# Patient Record
Sex: FEMALE | Race: WHITE | NOT HISPANIC OR LATINO | Employment: UNEMPLOYED | URBAN - METROPOLITAN AREA
[De-identification: names, ages, dates, MRNs, and addresses within clinical notes are randomized per-mention and may not be internally consistent; named-entity substitution may affect disease eponyms.]

---

## 2021-06-10 ENCOUNTER — OFFICE VISIT (OUTPATIENT)
Dept: OBSTETRICS AND GYNECOLOGY | Facility: CLINIC | Age: 46
End: 2021-06-10

## 2021-06-10 ENCOUNTER — TELEPHONE (OUTPATIENT)
Dept: OBSTETRICS AND GYNECOLOGY | Facility: CLINIC | Age: 46
End: 2021-06-10

## 2021-06-10 VITALS
DIASTOLIC BLOOD PRESSURE: 82 MMHG | WEIGHT: 163 LBS | SYSTOLIC BLOOD PRESSURE: 140 MMHG | BODY MASS INDEX: 30.78 KG/M2 | HEIGHT: 61 IN

## 2021-06-10 DIAGNOSIS — E04.9 ENLARGED THYROID: ICD-10-CM

## 2021-06-10 DIAGNOSIS — Z01.419 ROUTINE GYNECOLOGICAL EXAMINATION: Primary | ICD-10-CM

## 2021-06-10 DIAGNOSIS — Z11.51 SPECIAL SCREENING EXAMINATION FOR HUMAN PAPILLOMAVIRUS (HPV): ICD-10-CM

## 2021-06-10 DIAGNOSIS — Z01.419 PAP SMEAR, LOW-RISK: ICD-10-CM

## 2021-06-10 LAB
BILIRUB BLD-MCNC: NEGATIVE MG/DL
CLARITY, POC: CLEAR
COLOR UR: YELLOW
GLUCOSE UR STRIP-MCNC: NEGATIVE MG/DL
KETONES UR QL: NEGATIVE
LEUKOCYTE EST, POC: NEGATIVE
NITRITE UR-MCNC: NEGATIVE MG/ML
PH UR: 5 [PH] (ref 5–8)
PROT UR STRIP-MCNC: NEGATIVE MG/DL
RBC # UR STRIP: NEGATIVE /UL
SP GR UR: 1.03 (ref 1–1.03)
UROBILINOGEN UR QL: NORMAL

## 2021-06-10 PROCEDURE — 99386 PREV VISIT NEW AGE 40-64: CPT | Performed by: OBSTETRICS & GYNECOLOGY

## 2021-06-10 PROCEDURE — 99214 OFFICE O/P EST MOD 30 MIN: CPT | Performed by: OBSTETRICS & GYNECOLOGY

## 2021-06-10 PROCEDURE — 81002 URINALYSIS NONAUTO W/O SCOPE: CPT | Performed by: OBSTETRICS & GYNECOLOGY

## 2021-06-10 RX ORDER — METAXALONE 800 MG/1
800 TABLET ORAL DAILY PRN
COMMUNITY
Start: 2021-06-09

## 2021-06-10 RX ORDER — ACETAMINOPHEN 325 MG/1
650 TABLET ORAL EVERY 6 HOURS PRN
COMMUNITY

## 2021-06-10 NOTE — PROGRESS NOTES
GYN Annual Exam     CC- Here for annual exam.     Chante Thompson is a 45 y.o. female who presents for annual well woman exam. Pt has not been seen in over 4 years. She is considered a new pt to me. Pt has hx of BTL. Pt is getting her cycle every month and it is lasting 6-10 days. Pt reports that her primary care physician sent her to see ENT due to an enlarged thyroid. She was seen by ENT and had a biopsy of her thyroid and told it was fine. Pt reports that it is now enlarging again.     OB History    No obstetric history on file.         Current contraception: tubal ligation  History of abnormal Pap smear: yes - s/p LEEP  History of abnormal mammogram: no  Family history of uterine, colon or ovarian cancer: no  Family history of breast cancer: yes - mother with breast cancer greater than age 50.    Health Maintenance   Topic Date Due   • Annual Gynecologic Pelvic and Breast Exam  Never done   • COLORECTAL CANCER SCREENING  Never done   • ANNUAL PHYSICAL  Never done   • COVID-19 Vaccine (1) Never done   • TDAP/TD VACCINES (1 - Tdap) Never done   • HEPATITIS C SCREENING  Never done   • PAP SMEAR  Never done   • INFLUENZA VACCINE  08/01/2021   • Pneumococcal Vaccine 0-64  Aged Out       History reviewed. No pertinent past medical history.    Past Surgical History:   Procedure Laterality Date   • BACK SURGERY     • EYE SURGERY     • TUBAL ABDOMINAL LIGATION           Current Outpatient Medications:   •  acetaminophen (TYLENOL) 325 MG tablet, Take 650 mg by mouth Every 6 (Six) Hours As Needed for Mild Pain ., Disp: , Rfl:   •  levocetirizine (XYZAL) 5 MG tablet, Take 5 mg by mouth Every Evening., Disp: , Rfl:   •  metaxalone (SKELAXIN) 800 MG tablet, , Disp: , Rfl:     No Known Allergies    Social History     Tobacco Use   • Smoking status: Never Smoker   • Smokeless tobacco: Never Used   Vaping Use   • Vaping Use: Never used   Substance Use Topics   • Alcohol use: Not Currently   • Drug use: Defer       History  "reviewed. No pertinent family history.    Review of Systems    /82   Ht 154.9 cm (61\")   Wt 73.9 kg (163 lb)   LMP 05/17/2021   BMI 30.80 kg/m²     Physical Exam  Vitals reviewed.   Constitutional:       General: She is not in acute distress.     Appearance: Normal appearance. She is well-developed and normal weight. She is not ill-appearing, toxic-appearing or diaphoretic.   HENT:      Mouth/Throat:      Dentition: Normal dentition. No dental caries.   Neck:      Thyroid: Thyroid mass and thyromegaly present. No thyroid tenderness.        Comments: Right thyroid mass palpated approx 3cm.  Cardiovascular:      Rate and Rhythm: Normal rate and regular rhythm.      Heart sounds: Normal heart sounds.   Pulmonary:      Effort: Pulmonary effort is normal. No respiratory distress.      Breath sounds: Normal breath sounds. No stridor. No wheezing.   Chest:      Breasts:         Right: No inverted nipple, mass, nipple discharge, skin change or tenderness.         Left: No inverted nipple, mass, nipple discharge, skin change or tenderness.   Abdominal:      General: There is no distension.      Palpations: Abdomen is soft. There is no mass.      Tenderness: There is no abdominal tenderness.   Genitourinary:     Labia:         Right: No rash, tenderness or lesion.         Left: No rash, tenderness or lesion.       Vagina: No vaginal discharge, tenderness or bleeding.      Cervix: No cervical motion tenderness, discharge or friability.      Uterus: Not deviated, not enlarged, not fixed and not tender.       Adnexa:         Right: No mass, tenderness or fullness.          Left: No mass, tenderness or fullness.     Musculoskeletal:         General: No tenderness. Normal range of motion.      Cervical back: Full passive range of motion without pain and normal range of motion.   Skin:     General: Skin is warm.      Findings: No erythema or rash.   Neurological:      General: No focal deficit present.      Mental Status: " She is alert and oriented to person, place, and time. Mental status is at baseline.      Cranial Nerves: No cranial nerve deficit.      Motor: No weakness.      Coordination: Coordination normal.   Psychiatric:         Mood and Affect: Mood normal.         Behavior: Behavior normal.         Thought Content: Thought content normal.         Judgment: Judgment normal.            Assessment/Plan    1) GYN HM: Check pap smear.  SBE demonstrated and encouraged. MMG neg at [facility].  2) STD screening: declines  3) Contraception: BTL  4) Enlarged thyroid: Check thyroid US. Check thyroid panel. Pt has hx of benign biopsy in 2019.  5) Diet and Exercise discussed  6) Smoking Status: nonsmoker  7) Social: I delivered her 8yo daughter. Pt used to be in the [facility] [employer]. She is from [place].  8) Follow up prn and 1 year       Diagnoses and all orders for this visit:    Routine gynecological examination  -     POC Urinalysis Dipstick  -     IgP, Aptima HPV    Pap smear, low-risk  -     IgP, Aptima HPV    Special screening examination for human papillomavirus (HPV)  -     IgP, Aptima HPV    Enlarged thyroid  -     US Thyroid; Future  -     TSH  -     T4, Free  -     T3  -     Thyroid Peroxidase Antibody    Other orders  -     metaxalone (SKELAXIN) 800 MG tablet  -     acetaminophen (TYLENOL) 325 MG tablet; Take 650 mg by mouth Every 6 (Six) Hours As Needed for Mild Pain .          Deborah Tan DO  6/10/2021  14:44 EDT

## 2021-06-11 LAB
T3 SERPL-MCNC: 92.1 NG/DL (ref 80–200)
T4 FREE SERPL-MCNC: 0.81 NG/DL (ref 0.93–1.7)
THYROPEROXIDASE AB SERPL-ACNC: 14 IU/ML (ref 0–34)
TSH SERPL DL<=0.005 MIU/L-ACNC: 1.87 UIU/ML (ref 0.27–4.2)

## 2021-06-14 LAB
CYTOLOGIST CVX/VAG CYTO: NORMAL
CYTOLOGY CVX/VAG DOC CYTO: NORMAL
CYTOLOGY CVX/VAG DOC THIN PREP: NORMAL
DX ICD CODE: NORMAL
HIV 1 & 2 AB SER-IMP: NORMAL
HPV I/H RISK 4 DNA CVX QL PROBE+SIG AMP: NEGATIVE
OTHER STN SPEC: NORMAL
STAT OF ADQ CVX/VAG CYTO-IMP: NORMAL

## 2021-07-12 ENCOUNTER — HOSPITAL ENCOUNTER (OUTPATIENT)
Dept: ULTRASOUND IMAGING | Facility: HOSPITAL | Age: 46
Discharge: HOME | End: 2021-07-12
Admitting: OBSTETRICS & GYNECOLOGY

## 2021-07-12 DIAGNOSIS — E04.9 ENLARGED THYROID: ICD-10-CM

## 2021-07-12 PROCEDURE — 76536 US EXAM OF HEAD AND NECK: CPT

## 2021-07-22 DIAGNOSIS — E04.9 ENLARGED THYROID: Primary | ICD-10-CM

## 2021-11-23 ENCOUNTER — PRE-ADMISSION TESTING (OUTPATIENT)
Dept: PREADMISSION TESTING | Facility: HOSPITAL | Age: 46
End: 2021-11-23

## 2021-11-23 VITALS
OXYGEN SATURATION: 100 % | HEART RATE: 67 BPM | WEIGHT: 161 LBS | HEIGHT: 61 IN | TEMPERATURE: 97.9 F | SYSTOLIC BLOOD PRESSURE: 130 MMHG | BODY MASS INDEX: 30.4 KG/M2 | DIASTOLIC BLOOD PRESSURE: 85 MMHG | RESPIRATION RATE: 16 BRPM

## 2021-11-23 LAB
DEPRECATED RDW RBC AUTO: 44.4 FL (ref 37–54)
ERYTHROCYTE [DISTWIDTH] IN BLOOD BY AUTOMATED COUNT: 14.6 % (ref 12.3–15.4)
HCG SERPL QL: NEGATIVE
HCT VFR BLD AUTO: 32.2 % (ref 34–46.6)
HGB BLD-MCNC: 10.8 G/DL (ref 12–15.9)
MCH RBC QN AUTO: 28.1 PG (ref 26.6–33)
MCHC RBC AUTO-ENTMCNC: 33.5 G/DL (ref 31.5–35.7)
MCV RBC AUTO: 83.9 FL (ref 79–97)
PLATELET # BLD AUTO: 253 10*3/MM3 (ref 140–450)
PMV BLD AUTO: 10.3 FL (ref 6–12)
RBC # BLD AUTO: 3.84 10*6/MM3 (ref 3.77–5.28)
WBC NRBC COR # BLD: 3.41 10*3/MM3 (ref 3.4–10.8)

## 2021-11-23 PROCEDURE — 85027 COMPLETE CBC AUTOMATED: CPT

## 2021-11-23 PROCEDURE — 36415 COLL VENOUS BLD VENIPUNCTURE: CPT

## 2021-11-23 PROCEDURE — 84703 CHORIONIC GONADOTROPIN ASSAY: CPT

## 2021-11-23 RX ORDER — PRAMIPEXOLE DIHYDROCHLORIDE 0.12 MG/1
0.12 TABLET ORAL NIGHTLY PRN
COMMUNITY

## 2021-11-23 NOTE — DISCHARGE INSTRUCTIONS
Take the following medications the morning of surgery:  NONE        COVID TESTING IS SCHEDUELD FOR 11/27/2021 @ 0850 VIA [facility] THROUGH TESTING      ARRIVAL TIME/DATE FOR SURGERY IS 11/30/2021 @ 0530    If you are on prescription narcotic pain medication to control your pain you may also take that medication the morning of surgery.    General Instructions:  • Do not eat solid food after midnight the night before surgery.  • You may drink clear liquids day of surgery but must stop at least one hour before your hospital arrival time.  • It is beneficial for you to have a clear drink that contains carbohydrates the day of surgery.  We suggest a 12 to 20 ounce bottle of Gatorade or Powerade for non-diabetic patients or a 12 to 20 ounce bottle of G2 or Powerade Zero for diabetic patients. (Pediatric patients, are not advised to drink a 12 to 20 ounce carbohydrate drink)    Clear liquids are liquids you can see through.  Nothing red in color.     Plain water                               Sports drinks  Sodas                                   Gelatin (Jell-O)  Fruit juices without pulp such as white grape juice and apple juice  Popsicles that contain no fruit or yogurt  Tea or coffee (no cream or milk added)  Gatorade / Powerade  G2 / Powerade Zero    • Patients who avoid smoking, chewing tobacco and alcohol for 4 weeks prior to surgery have a reduced risk of post-operative complications.  Quit smoking as many days before surgery as you can.  • Do not smoke, use chewing tobacco or drink alcohol the day of surgery.   • If applicable bring your C-PAP/ BI-PAP machine.  • Bring any papers given to you in the doctor’s office.  • Wear clean comfortable clothes.  • Do not wear contact lenses, false eyelashes or make-up.  Bring a case for your glasses.   • Bring crutches or walker if applicable.  • Remove all piercings.  Leave jewelry and any other valuables at home.  • Hair extensions with metal clips must be removed prior  to surgery.  • The Pre-Admission Testing nurse will instruct you to bring medications if unable to obtain an accurate list in Pre-Admission Testing.          Preventing a Surgical Site Infection:  • For 2 to 3 days before surgery, avoid shaving with a razor because the razor can irritate skin and make it easier to develop an infection.    • Any areas of open skin can increase the risk of a post-operative wound infection by allowing bacteria to enter and travel throughout the body.  Notify your surgeon if you have any skin wounds / rashes even if it is not near the expected surgical site.  The area will need assessed to determine if surgery should be delayed until it is healed.  • The night prior to surgery shower using a fresh bar of anti-bacterial soap (such as Dial) and clean washcloth.  Sleep in a clean bed with clean clothing.  Do not allow pets to sleep with you.  • Shower on the morning of surgery using a fresh bar of anti-bacterial soap (such as Dial) and clean washcloth.  Dry with a clean towel and dress in clean clothing.  • Ask your surgeon if you will be receiving antibiotics prior to surgery.  • Make sure you, your family, and all healthcare providers clean their hands with soap and water or an alcohol based hand  before caring for you or your wound.    Day of surgery:  Your arrival time is approximately two hours before your scheduled surgery time.  Upon arrival, a Pre-op nurse and Anesthesiologist will review your health history, obtain vital signs, and answer questions you may have.  The only belongings needed at this time will be a list of your home medications and if applicable your C-PAP/BI-PAP machine.  A Pre-op nurse will start an IV and you may receive medication in preparation for surgery, including something to help you relax.     Please be aware that surgery does come with discomfort.  We want to make every effort to control your discomfort so please discuss any uncontrolled symptoms  with your nurse.   Your doctor will most likely have prescribed pain medications.      If you are going home after surgery you will receive individualized written care instructions before being discharged.  A responsible adult must drive you to and from the hospital on the day of your surgery and stay with you for 24 hours.  Discharge prescriptions can be filled by the hospital pharmacy during regular pharmacy hours.  If you are having surgery late in the day/evening your prescription may be e-prescribed to your pharmacy.  Please verify your pharmacy hours or chose a 24 hour pharmacy to avoid not having access to your prescription because your pharmacy has closed for the day.    If you are staying overnight following surgery, you will be transported to your hospital room following the recovery period.  [facility] has all private rooms.    If you have any questions please call Pre-Admission Testing at [phone].  Deductibles and co-payments are collected on the day of service. Please be prepared to pay the required co-pay, deductible or deposit on the day of service as defined by your plan.    Patient Education for Self-Quarantine Process    • Following your COVID testing, we strongly recommend that you wear a mask when you are with other people and practice social distancing.   • Limit your activities to only required outings.  • Wash your hands with soap and water frequently for at least 20 seconds.   • Avoid touching your eyes, nose and mouth with unwashed hands.  • Do not share anything - utensils, drinking glasses, food from the same bowl.   • Sanitize household surfaces daily. Include all high touch areas (door handles, light switches, phones, countertops, etc.)    Call your surgeon immediately if you experience any of the following symptoms:  • Sore Throat  • Shortness of Breath or difficulty breathing  • Cough  • Chills  • Body soreness or muscle pain  • Headache  • Fever  • New loss of  taste or smell  • Do not arrive for your surgery ill.  Your procedure will need to be rescheduled to another time.  You will need to call your physician before the day of surgery to avoid any unnecessary exposure to hospital staff as well as other patients.

## 2021-11-24 ENCOUNTER — TRANSCRIBE ORDERS (OUTPATIENT)
Dept: ADMINISTRATIVE | Facility: HOSPITAL | Age: 46
End: 2021-11-24

## 2021-11-24 DIAGNOSIS — Z01.818 PRE-OP TESTING: Primary | ICD-10-CM

## 2021-11-27 ENCOUNTER — LAB (OUTPATIENT)
Dept: LAB | Facility: HOSPITAL | Age: 46
End: 2021-11-27

## 2021-11-27 LAB — SARS-COV-2 ORF1AB RESP QL NAA+PROBE: NOT DETECTED

## 2021-11-27 PROCEDURE — U0004 COV-19 TEST NON-CDC HGH THRU: HCPCS | Performed by: NURSE PRACTITIONER

## 2021-11-27 PROCEDURE — C9803 HOPD COVID-19 SPEC COLLECT: HCPCS | Performed by: NURSE PRACTITIONER

## 2021-11-30 ENCOUNTER — ANESTHESIA (OUTPATIENT)
Dept: PERIOP | Facility: HOSPITAL | Age: 46
End: 2021-11-30

## 2021-11-30 ENCOUNTER — HOSPITAL ENCOUNTER (OUTPATIENT)
Facility: HOSPITAL | Age: 46
Setting detail: HOSPITAL OUTPATIENT SURGERY
Discharge: HOME | End: 2021-11-30
Attending: OTOLARYNGOLOGY | Admitting: OTOLARYNGOLOGY

## 2021-11-30 ENCOUNTER — ANESTHESIA EVENT (OUTPATIENT)
Dept: PERIOP | Facility: HOSPITAL | Age: 46
End: 2021-11-30

## 2021-11-30 VITALS
DIASTOLIC BLOOD PRESSURE: 70 MMHG | TEMPERATURE: 98 F | RESPIRATION RATE: 18 BRPM | HEART RATE: 68 BPM | SYSTOLIC BLOOD PRESSURE: 120 MMHG | OXYGEN SATURATION: 94 %

## 2021-11-30 DIAGNOSIS — E07.9 THYROID MASS: ICD-10-CM

## 2021-11-30 LAB
B-HCG UR QL: NEGATIVE
EXPIRATION DATE: NORMAL
INTERNAL NEGATIVE CONTROL: NEGATIVE
INTERNAL POSITIVE CONTROL: POSITIVE
Lab: NORMAL

## 2021-11-30 PROCEDURE — 25010000002 FENTANYL CITRATE (PF) 50 MCG/ML SOLUTION: Performed by: ANESTHESIOLOGY

## 2021-11-30 PROCEDURE — 81025 URINE PREGNANCY TEST: CPT | Performed by: ANESTHESIOLOGY

## 2021-11-30 PROCEDURE — 25010000002 DEXAMETHASONE PER 1 MG: Performed by: NURSE ANESTHETIST, CERTIFIED REGISTERED

## 2021-11-30 PROCEDURE — 25010000002 MIDAZOLAM PER 1 MG: Performed by: ANESTHESIOLOGY

## 2021-11-30 PROCEDURE — 88307 TISSUE EXAM BY PATHOLOGIST: CPT | Performed by: OTOLARYNGOLOGY

## 2021-11-30 PROCEDURE — 88331 PATH CONSLTJ SURG 1 BLK 1SPC: CPT | Performed by: OTOLARYNGOLOGY

## 2021-11-30 PROCEDURE — 25010000002 ONDANSETRON PER 1 MG: Performed by: NURSE ANESTHETIST, CERTIFIED REGISTERED

## 2021-11-30 PROCEDURE — 88334 PATH CONSLTJ SURG CYTO XM EA: CPT | Performed by: OTOLARYNGOLOGY

## 2021-11-30 PROCEDURE — 25010000002 PROPOFOL 10 MG/ML EMULSION: Performed by: NURSE ANESTHETIST, CERTIFIED REGISTERED

## 2021-11-30 PROCEDURE — 25010000002 NEOSTIGMINE 5 MG/10ML SOLUTION: Performed by: NURSE ANESTHETIST, CERTIFIED REGISTERED

## 2021-11-30 PROCEDURE — C1729 CATH, DRAINAGE: HCPCS | Performed by: OTOLARYNGOLOGY

## 2021-11-30 PROCEDURE — 25010000002 FENTANYL CITRATE (PF) 50 MCG/ML SOLUTION: Performed by: NURSE ANESTHETIST, CERTIFIED REGISTERED

## 2021-11-30 PROCEDURE — 25010000002 PHENYLEPHRINE 10 MG/ML SOLUTION: Performed by: NURSE ANESTHETIST, CERTIFIED REGISTERED

## 2021-11-30 RX ORDER — HYDROCODONE BITARTRATE AND ACETAMINOPHEN 7.5; 325 MG/1; MG/1
1 TABLET ORAL EVERY 4 HOURS PRN
Status: DISCONTINUED | OUTPATIENT
Start: 2021-11-30 | End: 2021-12-02 | Stop reason: HOSPADM

## 2021-11-30 RX ORDER — LIDOCAINE HYDROCHLORIDE 20 MG/ML
INJECTION, SOLUTION INFILTRATION; PERINEURAL AS NEEDED
Status: DISCONTINUED | OUTPATIENT
Start: 2021-11-30 | End: 2021-11-30 | Stop reason: SURG

## 2021-11-30 RX ORDER — SCOLOPAMINE TRANSDERMAL SYSTEM 1 MG/1
1 PATCH, EXTENDED RELEASE TRANSDERMAL ONCE
Status: COMPLETED | OUTPATIENT
Start: 2021-11-30 | End: 2021-12-01

## 2021-11-30 RX ORDER — EPHEDRINE SULFATE 50 MG/ML
INJECTION, SOLUTION INTRAVENOUS AS NEEDED
Status: DISCONTINUED | OUTPATIENT
Start: 2021-11-30 | End: 2021-11-30 | Stop reason: SURG

## 2021-11-30 RX ORDER — NEOSTIGMINE METHYLSULFATE 0.5 MG/ML
INJECTION, SOLUTION INTRAVENOUS AS NEEDED
Status: DISCONTINUED | OUTPATIENT
Start: 2021-11-30 | End: 2021-11-30 | Stop reason: SURG

## 2021-11-30 RX ORDER — GLYCOPYRROLATE 0.2 MG/ML
INJECTION INTRAMUSCULAR; INTRAVENOUS AS NEEDED
Status: DISCONTINUED | OUTPATIENT
Start: 2021-11-30 | End: 2021-11-30 | Stop reason: SURG

## 2021-11-30 RX ORDER — ROCURONIUM BROMIDE 10 MG/ML
INJECTION, SOLUTION INTRAVENOUS AS NEEDED
Status: DISCONTINUED | OUTPATIENT
Start: 2021-11-30 | End: 2021-11-30 | Stop reason: SURG

## 2021-11-30 RX ORDER — FENTANYL CITRATE 50 UG/ML
100 INJECTION, SOLUTION INTRAMUSCULAR; INTRAVENOUS
Status: DISCONTINUED | OUTPATIENT
Start: 2021-11-30 | End: 2021-11-30 | Stop reason: HOSPADM

## 2021-11-30 RX ORDER — FENTANYL CITRATE 50 UG/ML
INJECTION, SOLUTION INTRAMUSCULAR; INTRAVENOUS AS NEEDED
Status: DISCONTINUED | OUTPATIENT
Start: 2021-11-30 | End: 2021-11-30 | Stop reason: SURG

## 2021-11-30 RX ORDER — ONDANSETRON 4 MG/1
4 TABLET, FILM COATED ORAL ONCE AS NEEDED
Status: DISCONTINUED | OUTPATIENT
Start: 2021-11-30 | End: 2021-12-02 | Stop reason: HOSPADM

## 2021-11-30 RX ORDER — LIDOCAINE HYDROCHLORIDE 10 MG/ML
0.5 INJECTION, SOLUTION EPIDURAL; INFILTRATION; INTRACAUDAL; PERINEURAL ONCE AS NEEDED
Status: COMPLETED | OUTPATIENT
Start: 2021-11-30 | End: 2021-11-30

## 2021-11-30 RX ORDER — ONDANSETRON 2 MG/ML
INJECTION INTRAMUSCULAR; INTRAVENOUS AS NEEDED
Status: DISCONTINUED | OUTPATIENT
Start: 2021-11-30 | End: 2021-11-30 | Stop reason: SURG

## 2021-11-30 RX ORDER — EPHEDRINE SULFATE 50 MG/ML
5 INJECTION, SOLUTION INTRAVENOUS ONCE AS NEEDED
Status: DISCONTINUED | OUTPATIENT
Start: 2021-11-30 | End: 2021-12-02 | Stop reason: HOSPADM

## 2021-11-30 RX ORDER — SODIUM CHLORIDE 0.9 % (FLUSH) 0.9 %
3 SYRINGE (ML) INJECTION EVERY 12 HOURS SCHEDULED
Status: DISCONTINUED | OUTPATIENT
Start: 2021-11-30 | End: 2021-11-30 | Stop reason: HOSPADM

## 2021-11-30 RX ORDER — MAGNESIUM HYDROXIDE 1200 MG/15ML
LIQUID ORAL AS NEEDED
Status: DISCONTINUED | OUTPATIENT
Start: 2021-11-30 | End: 2021-11-30 | Stop reason: HOSPADM

## 2021-11-30 RX ORDER — PHENYLEPHRINE HYDROCHLORIDE 10 MG/ML
INJECTION INTRAVENOUS AS NEEDED
Status: DISCONTINUED | OUTPATIENT
Start: 2021-11-30 | End: 2021-11-30 | Stop reason: SURG

## 2021-11-30 RX ORDER — FENTANYL CITRATE 50 UG/ML
50 INJECTION, SOLUTION INTRAMUSCULAR; INTRAVENOUS ONCE
Status: DISCONTINUED | OUTPATIENT
Start: 2021-11-30 | End: 2021-11-30 | Stop reason: HOSPADM

## 2021-11-30 RX ORDER — LIDOCAINE HYDROCHLORIDE AND EPINEPHRINE 10; 10 MG/ML; UG/ML
INJECTION, SOLUTION INFILTRATION; PERINEURAL AS NEEDED
Status: DISCONTINUED | OUTPATIENT
Start: 2021-11-30 | End: 2021-11-30 | Stop reason: HOSPADM

## 2021-11-30 RX ORDER — FLUMAZENIL 0.1 MG/ML
0.2 INJECTION INTRAVENOUS AS NEEDED
Status: DISCONTINUED | OUTPATIENT
Start: 2021-11-30 | End: 2021-12-02 | Stop reason: HOSPADM

## 2021-11-30 RX ORDER — MIDAZOLAM HYDROCHLORIDE 1 MG/ML
1 INJECTION INTRAMUSCULAR; INTRAVENOUS
Status: DISCONTINUED | OUTPATIENT
Start: 2021-11-30 | End: 2021-11-30 | Stop reason: HOSPADM

## 2021-11-30 RX ORDER — HYDROCODONE BITARTRATE AND ACETAMINOPHEN 7.5; 325 MG/1; MG/1
1-2 TABLET ORAL EVERY 4 HOURS PRN
Qty: 12 TABLET | Refills: 0 | Status: SHIPPED | OUTPATIENT
Start: 2021-11-30

## 2021-11-30 RX ORDER — HYDROMORPHONE HYDROCHLORIDE 1 MG/ML
0.5 INJECTION, SOLUTION INTRAMUSCULAR; INTRAVENOUS; SUBCUTANEOUS
Status: DISCONTINUED | OUTPATIENT
Start: 2021-11-30 | End: 2021-12-02 | Stop reason: HOSPADM

## 2021-11-30 RX ORDER — MIDAZOLAM HYDROCHLORIDE 1 MG/ML
2 INJECTION INTRAMUSCULAR; INTRAVENOUS ONCE
Status: DISCONTINUED | OUTPATIENT
Start: 2021-11-30 | End: 2021-11-30 | Stop reason: HOSPADM

## 2021-11-30 RX ORDER — SODIUM CHLORIDE, SODIUM LACTATE, POTASSIUM CHLORIDE, CALCIUM CHLORIDE 600; 310; 30; 20 MG/100ML; MG/100ML; MG/100ML; MG/100ML
9 INJECTION, SOLUTION INTRAVENOUS CONTINUOUS
Status: DISCONTINUED | OUTPATIENT
Start: 2021-11-30 | End: 2021-12-02 | Stop reason: HOSPADM

## 2021-11-30 RX ORDER — DEXAMETHASONE SODIUM PHOSPHATE 10 MG/ML
INJECTION INTRAMUSCULAR; INTRAVENOUS AS NEEDED
Status: DISCONTINUED | OUTPATIENT
Start: 2021-11-30 | End: 2021-11-30 | Stop reason: SURG

## 2021-11-30 RX ORDER — FENTANYL CITRATE 50 UG/ML
50 INJECTION, SOLUTION INTRAMUSCULAR; INTRAVENOUS
Status: DISCONTINUED | OUTPATIENT
Start: 2021-11-30 | End: 2021-12-02 | Stop reason: HOSPADM

## 2021-11-30 RX ORDER — PROPOFOL 10 MG/ML
VIAL (ML) INTRAVENOUS AS NEEDED
Status: DISCONTINUED | OUTPATIENT
Start: 2021-11-30 | End: 2021-11-30 | Stop reason: SURG

## 2021-11-30 RX ORDER — HYDROCODONE BITARTRATE AND ACETAMINOPHEN 5; 325 MG/1; MG/1
1 TABLET ORAL ONCE AS NEEDED
Status: DISCONTINUED | OUTPATIENT
Start: 2021-11-30 | End: 2021-12-02 | Stop reason: HOSPADM

## 2021-11-30 RX ORDER — ONDANSETRON 4 MG/1
4 TABLET, FILM COATED ORAL DAILY PRN
Qty: 12 TABLET | Refills: 1 | Status: SHIPPED | OUTPATIENT
Start: 2021-11-30

## 2021-11-30 RX ORDER — ONDANSETRON 2 MG/ML
4 INJECTION INTRAMUSCULAR; INTRAVENOUS ONCE AS NEEDED
Status: DISCONTINUED | OUTPATIENT
Start: 2021-11-30 | End: 2021-12-02 | Stop reason: HOSPADM

## 2021-11-30 RX ORDER — SODIUM CHLORIDE 0.9 % (FLUSH) 0.9 %
3-10 SYRINGE (ML) INJECTION AS NEEDED
Status: DISCONTINUED | OUTPATIENT
Start: 2021-11-30 | End: 2021-11-30 | Stop reason: HOSPADM

## 2021-11-30 RX ADMIN — DEXAMETHASONE SODIUM PHOSPHATE 8 MG: 10 INJECTION INTRAMUSCULAR; INTRAVENOUS at 07:45

## 2021-11-30 RX ADMIN — PHENYLEPHRINE HYDROCHLORIDE 150 MCG: 10 INJECTION, SOLUTION INTRAVENOUS at 07:53

## 2021-11-30 RX ADMIN — LIDOCAINE HYDROCHLORIDE 100 MG: 20 INJECTION, SOLUTION INFILTRATION; PERINEURAL at 07:39

## 2021-11-30 RX ADMIN — FENTANYL CITRATE 50 MCG: 50 INJECTION INTRAMUSCULAR; INTRAVENOUS at 09:39

## 2021-11-30 RX ADMIN — SODIUM CHLORIDE, POTASSIUM CHLORIDE, SODIUM LACTATE AND CALCIUM CHLORIDE 9 ML/HR: 600; 310; 30; 20 INJECTION, SOLUTION INTRAVENOUS at 06:32

## 2021-11-30 RX ADMIN — LIDOCAINE HYDROCHLORIDE 0.5 ML: 10 INJECTION, SOLUTION EPIDURAL; INFILTRATION; INTRACAUDAL; PERINEURAL at 06:33

## 2021-11-30 RX ADMIN — ONDANSETRON 4 MG: 2 INJECTION INTRAMUSCULAR; INTRAVENOUS at 08:55

## 2021-11-30 RX ADMIN — HYDROCODONE BITARTRATE AND ACETAMINOPHEN 1 TABLET: 7.5; 325 TABLET ORAL at 09:34

## 2021-11-30 RX ADMIN — MIDAZOLAM 1 MG: 1 INJECTION INTRAMUSCULAR; INTRAVENOUS at 06:32

## 2021-11-30 RX ADMIN — FENTANYL CITRATE 50 MCG: 50 INJECTION INTRAMUSCULAR; INTRAVENOUS at 07:39

## 2021-11-30 RX ADMIN — PHENYLEPHRINE HYDROCHLORIDE 100 MCG: 10 INJECTION, SOLUTION INTRAVENOUS at 07:49

## 2021-11-30 RX ADMIN — ROCURONIUM BROMIDE 50 MG: 50 INJECTION INTRAVENOUS at 07:39

## 2021-11-30 RX ADMIN — GLYCOPYRROLATE 0.6 MG: 0.2 INJECTION INTRAMUSCULAR; INTRAVENOUS at 08:55

## 2021-11-30 RX ADMIN — PHENYLEPHRINE HYDROCHLORIDE 100 MCG: 10 INJECTION, SOLUTION INTRAVENOUS at 08:37

## 2021-11-30 RX ADMIN — GLYCOPYRROLATE 0.1 MG: 0.2 INJECTION INTRAMUSCULAR; INTRAVENOUS at 07:39

## 2021-11-30 RX ADMIN — FENTANYL CITRATE 50 MCG: 50 INJECTION INTRAMUSCULAR; INTRAVENOUS at 09:07

## 2021-11-30 RX ADMIN — NEOSTIGMINE METHYLSULFATE 4 MG: 0.5 INJECTION INTRAVENOUS at 08:55

## 2021-11-30 RX ADMIN — EPHEDRINE SULFATE 10 MG: 50 INJECTION INTRAVENOUS at 07:53

## 2021-11-30 RX ADMIN — ROCURONIUM BROMIDE 10 MG: 50 INJECTION INTRAVENOUS at 08:25

## 2021-11-30 RX ADMIN — PROPOFOL 200 MG: 10 INJECTION, EMULSION INTRAVENOUS at 07:39

## 2021-11-30 RX ADMIN — SCOLOPAMINE TRANSDERMAL SYSTEM 1 PATCH: 1 PATCH, EXTENDED RELEASE TRANSDERMAL at 06:32

## 2021-11-30 NOTE — ANESTHESIA PREPROCEDURE EVALUATION
Anesthesia Evaluation     Patient summary reviewed and Nursing notes reviewed   NPO Solid Status: > 8 hours             Airway   Mallampati: II  TM distance: >3 FB  Neck ROM: full  no difficulty expected  Dental - normal exam     Pulmonary - negative pulmonary ROS and normal exam   Cardiovascular - negative cardio ROS and normal exam        Neuro/Psych  (+) headaches,     GI/Hepatic/Renal/Endo - negative ROS     Musculoskeletal     (+) neck pain, radiculopathy  Abdominal  - normal exam   Substance History - negative use     OB/GYN negative ob/gyn ROS         Other                      Anesthesia Plan    ASA 2     general     intravenous induction     Anesthetic plan, all risks, benefits, and alternatives have been provided, discussed and informed consent has been obtained with: patient.    Plan discussed with CRNA.

## 2021-11-30 NOTE — ANESTHESIA POSTPROCEDURE EVALUATION
Patient: Chante Thompson    Procedure Summary     Date: 11/30/21 Room / Location:  [facility]  [facility]    Anesthesia Start: 0732 Anesthesia Stop: 0916    Procedure: RIGHT THYROID LOBECTOMY (Right Neck) Diagnosis:     Surgeons: [facility] Velásquez MD Provider: Morales Gilliam MD    Anesthesia Type: general ASA Status: 2          Anesthesia Type: general    Vitals  Vitals Value Taken Time   /70 11/30/21 1031   Temp 36.7 °C (98 °F) 11/30/21 1000   Pulse 83 11/30/21 1044   Resp 18 11/30/21 1030   SpO2 94 % 11/30/21 1044   Vitals shown include unvalidated device data.        Post Anesthesia Care and Evaluation    Patient location during evaluation: bedside  Patient participation: complete - patient participated  Level of consciousness: awake  Pain management: adequate  Airway patency: patent  Anesthetic complications: No anesthetic complications    Cardiovascular status: acceptable  Respiratory status: acceptable  Hydration status: acceptable    Comments: */70   Pulse 66   Temp 36.7 °C (98 °F) (Temporal)   Resp 18   LMP 11/26/2021   SpO2 91%

## 2021-11-30 NOTE — ANESTHESIA PROCEDURE NOTES
Airway  Urgency: elective    Date/Time: 11/30/2021 7:42 AM  Airway not difficult    General Information and Staff    Patient location during procedure: OR  Anesthesiologist: Morales Gilliam MD  CRNA: Angeline Sanchez CRNA    Indications and Patient Condition  Indications for airway management: airway protection    Preoxygenated: yes  MILS maintained throughout  Mask difficulty assessment: 2 - vent by mask + OA or adjuvant +/- NMBA    Final Airway Details  Final airway type: endotracheal airway      Successful airway: ETT  Cuffed: yes   Successful intubation technique: direct laryngoscopy  Endotracheal tube insertion site: oral  Blade: Albin  Blade size: 3  ETT size (mm): 7.0  Cormack-Lehane Classification: grade I - full view of glottis  Placement verified by: chest auscultation and capnometry   Cuff volume (mL): 6  Measured from: teeth  ETT/EBT  to teeth (cm): 21  Number of attempts at approach: 1  Assessment: lips, teeth, and gum same as pre-op and atraumatic intubation

## 2021-12-01 LAB
LAB AP CASE REPORT: NORMAL
Lab: NORMAL
PATH REPORT.FINAL DX SPEC: NORMAL
PATH REPORT.GROSS SPEC: NORMAL

## 2025-05-14 ENCOUNTER — OFFICE VISIT (OUTPATIENT)
Dept: SURGERY | Facility: CLINIC | Age: 50
End: 2025-05-14
Payer: COMMERCIAL

## 2025-05-14 VITALS
SYSTOLIC BLOOD PRESSURE: 142 MMHG | BODY MASS INDEX: 29.64 KG/M2 | HEART RATE: 72 BPM | DIASTOLIC BLOOD PRESSURE: 84 MMHG | RESPIRATION RATE: 16 BRPM | WEIGHT: 157 LBS | HEIGHT: 61 IN

## 2025-05-14 DIAGNOSIS — R13.19 OTHER DYSPHAGIA: ICD-10-CM

## 2025-05-14 DIAGNOSIS — Z86.0100 HISTORY OF COLON POLYPS: ICD-10-CM

## 2025-05-14 DIAGNOSIS — K64.4 EXTERNAL HEMORRHOID: Primary | ICD-10-CM

## 2025-05-14 PROCEDURE — 99203 OFFICE O/P NEW LOW 30 MIN: CPT | Performed by: SURGERY

## 2025-05-14 RX ORDER — LIDOCAINE 5 G/100G
1 CREAM RECTAL; TOPICAL 3 TIMES DAILY PRN
Status: SHIPPED | OUTPATIENT
Start: 2025-05-14

## 2025-05-14 RX ORDER — HYDROCORTISONE 25 MG/G
CREAM TOPICAL
Qty: 30 G | Refills: 0 | Status: SHIPPED | OUTPATIENT
Start: 2025-05-14 | End: 2025-05-24

## 2025-05-14 NOTE — PROGRESS NOTES
Chante Thompson 49 y.o. female presents @ the req of ZAHEER Strange for eval of hemorrhoids.  Pt states it started approx 2 weeks ago.  Pt states when she uses the rectal suppositories she can feel something inside her rectum.   Chief Complaint   Patient presents with    Hemorrhoids       History of Present Illness  The patient is a 49-year-old female who presents for evaluation of hemorrhoids.    She has been experiencing recurrent hemorrhoids, which initially presented during a period of illness characterized by significant diarrhea. A colonoscopy revealed inflammation, but no further details were provided. Following her discharge from the [employer], she experienced a period of remission. However, in , following a  for her daughter, the hemorrhoids recurred with increased severity. Despite a period of several years without symptoms, the hemorrhoids have recently returned, larger than before. She reports irregular bowel movements, often avoiding eating due to fear of pain. She describes the sensation of an external and internal hemorrhoid, likening it to a scratch or scrape. She has not noticed any recent bleeding but recalls frequent bleeding during her time in the [employer] as a . She underwent a colonoscopy prior to , during which polyps were removed.    She also reports occasional difficulty swallowing, with food sometimes becoming lodged in her throat.    PAST SURGICAL HISTORY:   in   Colonoscopy with polyp removal prior to     SOCIAL HISTORY  Occupations:  in the [employer]  Alcohol: Does not drink alcohol  Tobacco: Does not smoke or chew tobacco    FAMILY HISTORY  - Father: Bowel blockage, had lots of hemorrhoids and related issues after the age of 50.  - Mother: Heart blockage following complications from breast implants.      Review of Systems          Current Outpatient Medications:     acetaminophen (TYLENOL) 325 MG tablet, Take 650 mg by mouth Every 6 (Six)  Hours As Needed for Mild Pain ., Disp: , Rfl:     Hydrocortisone, Perianal, (Anusol-HC) 2.5 % rectal cream, Apply rectally 2 times daily, Disp: 30 g, Rfl: 0    Current Facility-Administered Medications:     Lidocaine (Anorectal) (LMX 5) 5 % cream cream 1 Application, 1 Application, Topical, TID PRN, Perrenoud, Raven, DO        No Known Allergies        Past Medical History:   Diagnosis Date    Hx of migraines     Thyroid nodule     Work related injury     NECK INJURY WHILE IN THE            Past Surgical History:   Procedure Laterality Date    BACK SURGERY      C5C6      SECTION WITH TUBAL  2011    COLONOSCOPY      EYE SURGERY Bilateral 2006    LASIK    THYROIDECTOMY Right 2021    Procedure: RIGHT THYROID LOBECTOMY;  Surgeon: [facility] Velásquez MD;  [facility];  Service: ENT;  Laterality: Right;    TONSILLECTOMY      ADENOIDECTOMY    TUBAL ABDOMINAL LIGATION  2011    VEIN LIGATION AND STRIPPING BILATERAL Bilateral 2017           Social History     Tobacco Use    Smoking status: Never    Smokeless tobacco: Never   Vaping Use    Vaping status: Never Used   Substance Use Topics    Alcohol use: Not Currently    Drug use: Never           Immunization History   Administered Date(s) Administered    COVID-19 (IFTIKHAR) 2021    COVID-19 (MODERNA) Monovalent Original Booster 2022           Physical Exam  Vitals and nursing note reviewed.   Constitutional:       Appearance: Normal appearance.   Cardiovascular:      Rate and Rhythm: Normal rate and regular rhythm.   Pulmonary:      Effort: Pulmonary effort is normal.      Breath sounds: Normal breath sounds.   Neurological:      General: No focal deficit present.      Mental Status: She is alert and oriented to person, place, and time.   Psychiatric:         Mood and Affect: Mood normal.         Behavior: Behavior normal.         Physical Exam  Respiratory: Clear breath sounds bilaterally.  Cardiovascular: Regular  "rate and rhythm, no murmurs, rubs, or gallops.  Rectal: External hemorrhoid noted.    Debilities/Disabilities Identified: None    Emotional Behavior: Appropriate      /84   Pulse 72   Resp 16   Ht 154.9 cm (61\")   Wt 71.2 kg (157 lb)   BMI 29.66 kg/m²         Diagnoses and all orders for this visit:    1. External hemorrhoid (Primary)  -     Lidocaine (Anorectal) (LMX 5) 5 % cream cream 1 Application    2. History of colon polyps    3. Other dysphagia    Other orders  -     Hydrocortisone, Perianal, (Anusol-HC) 2.5 % rectal cream; Apply rectally 2 times daily  Dispense: 30 g; Refill: 0      Assessment & Plan  1. Hemorrhoids.  History of hemorrhoids exacerbated by diarrhea and childbirth, with current increased size and discomfort. Conservative therapy will be initiated, including sitz baths, Metamucil, Tucks pads, and increased water intake. Two creams, one for general use and another for pain management, will be prescribed and sent to her pharmacy. A colonoscopy and an upper endoscopy are scheduled for 06/17/2025 to further evaluate her condition.    2. Dysphagia.  Reports difficulty swallowing, with food sometimes getting stuck in her throat. An upper endoscopy is scheduled for 06/17/2025 to investigate this issue further.    Risks, benefits, and alternatives of treatment were discussed. Conservative therapy for hemorrhoids includes sitz baths, Metamucil, Tucks pads, and increased water intake, which may alleviate symptoms and potentially avoid the need for surgery. Hemorrhoid surgery, although effective, can be painful and hemorrhoids may recur. The necessity of a colonoscopy and upper endoscopy was emphasized to ensure comprehensive evaluation and appropriate treatment.    Patient or patient representative verbalized consent for the use of Ambient Listening during the visit with  Raven Bullock DO for chart documentation. 5/14/2025  14:57 EDT        "

## 2025-05-14 NOTE — LETTER
May 14, 2025     ZAHEER Trevino  [address] [place] [id number]    Patient: Chante Thompson   YOB: 1975   Date of Visit: 2025     Dear ZAHEER Trevino:       Thank you for referring Chante Thompson to me for evaluation. Below are the relevant portions of my assessment and plan of care.    If you have questions, please do not hesitate to call me. I look forward to following Chante along with you.         Sincerely,        Raven Bullock DO        CC: No Recipients    Raven Bullock DO  [phone]  Sign when Signing Visit  Chante Thompson 49 y.o. female presents @ the req of ZAHEER Strange for eval of hemorrhoids.  Pt states it started approx 2 weeks ago.  Pt states when she uses the rectal suppositories she can feel something inside her rectum.   Chief Complaint   Patient presents with   • Hemorrhoids       History of Present Illness  The patient is a 49-year-old female who presents for evaluation of hemorrhoids.    She has been experiencing recurrent hemorrhoids, which initially presented during a period of illness characterized by significant diarrhea. A colonoscopy revealed inflammation, but no further details were provided. Following her discharge from the [employer], she experienced a period of remission. However, in , following a  for her daughter, the hemorrhoids recurred with increased severity. Despite a period of several years without symptoms, the hemorrhoids have recently returned, larger than before. She reports irregular bowel movements, often avoiding eating due to fear of pain. She describes the sensation of an external and internal hemorrhoid, likening it to a scratch or scrape. She has not noticed any recent bleeding but recalls frequent bleeding during her time in the [employer] as a . She underwent a colonoscopy prior to , during which polyps were removed.    She also reports occasional difficulty swallowing,  with food sometimes becoming lodged in her throat.    PAST SURGICAL HISTORY:   in   Colonoscopy with polyp removal prior to     SOCIAL HISTORY  Occupations:  in the [employer]  Alcohol: Does not drink alcohol  Tobacco: Does not smoke or chew tobacco    FAMILY HISTORY  - Father: Bowel blockage, had lots of hemorrhoids and related issues after the age of 50.  - Mother: Heart blockage following complications from breast implants.      Review of Systems          Current Outpatient Medications:   •  acetaminophen (TYLENOL) 325 MG tablet, Take 650 mg by mouth Every 6 (Six) Hours As Needed for Mild Pain ., Disp: , Rfl:   •  Hydrocortisone, Perianal, (Anusol-HC) 2.5 % rectal cream, Apply rectally 2 times daily, Disp: 30 g, Rfl: 0    Current Facility-Administered Medications:   •  Lidocaine (Anorectal) (LMX 5) 5 % cream cream 1 Application, 1 Application, Topical, TID PRN, Perrenoud, Raven, DO        No Known Allergies        Past Medical History:   Diagnosis Date   • Hx of migraines    • Thyroid nodule    • Work related injury     NECK INJURY WHILE IN THE            Past Surgical History:   Procedure Laterality Date   • BACK SURGERY      C5C6    •  SECTION WITH TUBAL     • COLONOSCOPY     • EYE SURGERY Bilateral     LASIK   • THYROIDECTOMY Right 2021    Procedure: RIGHT THYROID LOBECTOMY;  Surgeon: Thompson Velásquez MD;  [place] [facility] [place] [facility]  Service: ENT;  Laterality: Right;   • TONSILLECTOMY      ADENOIDECTOMY   • TUBAL ABDOMINAL LIGATION     • VEIN LIGATION AND STRIPPING BILATERAL Bilateral            Social History     Tobacco Use   • Smoking status: Never   • Smokeless tobacco: Never   Vaping Use   • Vaping status: Never Used   Substance Use Topics   • Alcohol use: Not Currently   • Drug use: Never           Immunization History   Administered Date(s) Administered   • COVID-19 (IFTIKHAR) 2021   • COVID-19 (MODERNA) Monovalent Original  "Booster 01/08/2022           Physical Exam  Vitals and nursing note reviewed.   Constitutional:       Appearance: Normal appearance.   Cardiovascular:      Rate and Rhythm: Normal rate and regular rhythm.   Pulmonary:      Effort: Pulmonary effort is normal.      Breath sounds: Normal breath sounds.   Neurological:      General: No focal deficit present.      Mental Status: She is alert and oriented to person, place, and time.   Psychiatric:         Mood and Affect: Mood normal.         Behavior: Behavior normal.         Physical Exam  Respiratory: Clear breath sounds bilaterally.  Cardiovascular: Regular rate and rhythm, no murmurs, rubs, or gallops.  Rectal: External hemorrhoid noted.    Debilities/Disabilities Identified: None    Emotional Behavior: Appropriate      /84   Pulse 72   Resp 16   Ht 154.9 cm (61\")   Wt 71.2 kg (157 lb)   BMI 29.66 kg/m²         Diagnoses and all orders for this visit:    1. External hemorrhoid (Primary)  -     Lidocaine (Anorectal) (LMX 5) 5 % cream cream 1 Application    2. History of colon polyps    3. Other dysphagia    Other orders  -     Hydrocortisone, Perianal, (Anusol-HC) 2.5 % rectal cream; Apply rectally 2 times daily  Dispense: 30 g; Refill: 0      Assessment & Plan  1. Hemorrhoids.  History of hemorrhoids exacerbated by diarrhea and childbirth, with current increased size and discomfort. Conservative therapy will be initiated, including sitz baths, Metamucil, Tucks pads, and increased water intake. Two creams, one for general use and another for pain management, will be prescribed and sent to her pharmacy. A colonoscopy and an upper endoscopy are scheduled for 06/17/2025 to further evaluate her condition.    2. Dysphagia.  Reports difficulty swallowing, with food sometimes getting stuck in her throat. An upper endoscopy is scheduled for 06/17/2025 to investigate this issue further.    Risks, benefits, and alternatives of treatment were discussed. Conservative " therapy for hemorrhoids includes sitz baths, Metamucil, Tucks pads, and increased water intake, which may alleviate symptoms and potentially avoid the need for surgery. Hemorrhoid surgery, although effective, can be painful and hemorrhoids may recur. The necessity of a colonoscopy and upper endoscopy was emphasized to ensure comprehensive evaluation and appropriate treatment.    Patient or patient representative verbalized consent for the use of Ambient Listening during the visit with  Raven Bullock DO for chart documentation. 5/14/2025  14:57 EDT

## 2025-05-15 ENCOUNTER — PATIENT ROUNDING (BHMG ONLY) (OUTPATIENT)
Dept: SURGERY | Facility: CLINIC | Age: 50
End: 2025-05-15
Payer: COMMERCIAL

## 2025-05-15 NOTE — PROGRESS NOTES
May 15, 2025    Hello, may I speak with Chante Thompson?    My name is Tejinder      I am  with [facility]  [facility]  [address] [place] [id number] [phone].    Before we get started may I verify your date of birth? 1975    I am calling to officially welcome you to our practice and ask about your recent visit. Is this a good time to talk? yes    Tell me about your visit with us. What things went well?  everything went well.        We're always looking for ways to make our patients' experiences even better. Do you have recommendations on ways we may improve?  no    Overall were you satisfied with your first visit to our practice? yes       I appreciate you taking the time to speak with me today. Is there anything else I can do for you? no      Thank you, and have a great day.

## 2025-06-12 ENCOUNTER — TELEPHONE (OUTPATIENT)
Dept: SURGERY | Facility: CLINIC | Age: 50
End: 2025-06-12
Payer: COMMERCIAL

## 2025-06-12 NOTE — TELEPHONE ENCOUNTER
Pt called and reports she is ill with sinus inf and taking antibiotics.  Pt req resched ENDO.    Pt resched ----> 07/15/25 @ 0800.

## 2025-07-14 ENCOUNTER — ANESTHESIA EVENT (OUTPATIENT)
Dept: PERIOP | Facility: HOSPITAL | Age: 50
End: 2025-07-14
Payer: COMMERCIAL

## 2025-07-14 RX ORDER — ATORVASTATIN CALCIUM 10 MG
20 TABLET ORAL
COMMUNITY
Start: 2025-06-13

## 2025-07-15 ENCOUNTER — HOSPITAL ENCOUNTER (OUTPATIENT)
Facility: HOSPITAL | Age: 50
Setting detail: HOSPITAL OUTPATIENT SURGERY
Discharge: HOME | End: 2025-07-15
Attending: SURGERY | Admitting: SURGERY
Payer: COMMERCIAL

## 2025-07-15 ENCOUNTER — ANESTHESIA (OUTPATIENT)
Dept: PERIOP | Facility: HOSPITAL | Age: 50
End: 2025-07-15
Payer: COMMERCIAL

## 2025-07-15 VITALS
RESPIRATION RATE: 15 BRPM | TEMPERATURE: 97.9 F | HEIGHT: 61 IN | WEIGHT: 146 LBS | DIASTOLIC BLOOD PRESSURE: 65 MMHG | OXYGEN SATURATION: 97 % | SYSTOLIC BLOOD PRESSURE: 96 MMHG | BODY MASS INDEX: 27.56 KG/M2 | HEART RATE: 52 BPM

## 2025-07-15 DIAGNOSIS — R13.19 OTHER DYSPHAGIA: ICD-10-CM

## 2025-07-15 DIAGNOSIS — Z86.0100 HISTORY OF COLON POLYPS: ICD-10-CM

## 2025-07-15 DIAGNOSIS — K64.4 EXTERNAL HEMORRHOID: ICD-10-CM

## 2025-07-15 PROCEDURE — 87081 CULTURE SCREEN ONLY: CPT | Performed by: SURGERY

## 2025-07-15 PROCEDURE — 25810000003 LACTATED RINGERS PER 1000 ML

## 2025-07-15 PROCEDURE — 25010000002 PROPOFOL 10 MG/ML EMULSION

## 2025-07-15 PROCEDURE — 25010000002 LIDOCAINE 2% SOLUTION

## 2025-07-15 RX ORDER — SUCRALFATE 1 G/1
1 TABLET ORAL 3 TIMES DAILY
Qty: 120 TABLET | Refills: 1 | Status: SHIPPED | OUTPATIENT
Start: 2025-07-15 | End: 2026-07-15

## 2025-07-15 RX ORDER — LIDOCAINE HYDROCHLORIDE 20 MG/ML
INJECTION, SOLUTION INFILTRATION; PERINEURAL AS NEEDED
Status: DISCONTINUED | OUTPATIENT
Start: 2025-07-15 | End: 2025-07-15 | Stop reason: SURG

## 2025-07-15 RX ORDER — SODIUM CHLORIDE, SODIUM LACTATE, POTASSIUM CHLORIDE, CALCIUM CHLORIDE 600; 310; 30; 20 MG/100ML; MG/100ML; MG/100ML; MG/100ML
9 INJECTION, SOLUTION INTRAVENOUS CONTINUOUS
Status: DISCONTINUED | OUTPATIENT
Start: 2025-07-15 | End: 2025-07-15 | Stop reason: HOSPADM

## 2025-07-15 RX ORDER — SODIUM CHLORIDE 0.9 % (FLUSH) 0.9 %
10 SYRINGE (ML) INJECTION EVERY 12 HOURS SCHEDULED
Status: DISCONTINUED | OUTPATIENT
Start: 2025-07-15 | End: 2025-07-15 | Stop reason: HOSPADM

## 2025-07-15 RX ORDER — PROPOFOL 10 MG/ML
VIAL (ML) INTRAVENOUS AS NEEDED
Status: DISCONTINUED | OUTPATIENT
Start: 2025-07-15 | End: 2025-07-15 | Stop reason: SURG

## 2025-07-15 RX ORDER — SODIUM CHLORIDE 9 MG/ML
40 INJECTION, SOLUTION INTRAVENOUS AS NEEDED
Status: DISCONTINUED | OUTPATIENT
Start: 2025-07-15 | End: 2025-07-15 | Stop reason: HOSPADM

## 2025-07-15 RX ORDER — ONDANSETRON 2 MG/ML
4 INJECTION INTRAMUSCULAR; INTRAVENOUS ONCE AS NEEDED
Status: DISCONTINUED | OUTPATIENT
Start: 2025-07-15 | End: 2025-07-15 | Stop reason: HOSPADM

## 2025-07-15 RX ORDER — SODIUM CHLORIDE 0.9 % (FLUSH) 0.9 %
10 SYRINGE (ML) INJECTION AS NEEDED
Status: DISCONTINUED | OUTPATIENT
Start: 2025-07-15 | End: 2025-07-15 | Stop reason: HOSPADM

## 2025-07-15 RX ORDER — LIDOCAINE HYDROCHLORIDE 10 MG/ML
0.5 INJECTION, SOLUTION EPIDURAL; INFILTRATION; INTRACAUDAL; PERINEURAL ONCE AS NEEDED
Status: DISCONTINUED | OUTPATIENT
Start: 2025-07-15 | End: 2025-07-15 | Stop reason: HOSPADM

## 2025-07-15 RX ORDER — SODIUM CHLORIDE, SODIUM LACTATE, POTASSIUM CHLORIDE, CALCIUM CHLORIDE 600; 310; 30; 20 MG/100ML; MG/100ML; MG/100ML; MG/100ML
100 INJECTION, SOLUTION INTRAVENOUS ONCE
Status: DISCONTINUED | OUTPATIENT
Start: 2025-07-15 | End: 2025-07-15 | Stop reason: HOSPADM

## 2025-07-15 RX ADMIN — LIDOCAINE HYDROCHLORIDE 60 MG: 20 INJECTION, SOLUTION INFILTRATION; PERINEURAL at 08:24

## 2025-07-15 RX ADMIN — SODIUM CHLORIDE, POTASSIUM CHLORIDE, SODIUM LACTATE AND CALCIUM CHLORIDE 9 ML/HR: 600; 310; 30; 20 INJECTION, SOLUTION INTRAVENOUS at 07:24

## 2025-07-15 RX ADMIN — PROPOFOL 550 MG: 10 INJECTION, EMULSION INTRAVENOUS at 08:24

## 2025-07-15 NOTE — H&P
Chante Thompson 49 y.o. female presents @ the req of ZAHEER Strange for eval of hemorrhoids.  Pt states it started approx 2 weeks ago.  Pt states when she uses the rectal suppositories she can feel something inside her rectum.       Chief Complaint   Patient presents with    Hemorrhoids         History of Present Illness  The patient is a 49-year-old female who presents for evaluation of hemorrhoids.     She has been experiencing recurrent hemorrhoids, which initially presented during a period of illness characterized by significant diarrhea. A colonoscopy revealed inflammation, but no further details were provided. Following her discharge from the [employer], she experienced a period of remission. However, in , following a  for her daughter, the hemorrhoids recurred with increased severity. Despite a period of several years without symptoms, the hemorrhoids have recently returned, larger than before. She reports irregular bowel movements, often avoiding eating due to fear of pain. She describes the sensation of an external and internal hemorrhoid, likening it to a scratch or scrape. She has not noticed any recent bleeding but recalls frequent bleeding during her time in the [employer] as a . She underwent a colonoscopy prior to , during which polyps were removed.     She also reports occasional difficulty swallowing, with food sometimes becoming lodged in her throat.     PAST SURGICAL HISTORY:   in   Colonoscopy with polyp removal prior to      SOCIAL HISTORY  Occupations:  in the [employer]  Alcohol: Does not drink alcohol  Tobacco: Does not smoke or chew tobacco     FAMILY HISTORY  - Father: Bowel blockage, had lots of hemorrhoids and related issues after the age of 50.  - Mother: Heart blockage following complications from breast implants.        Review of Systems             Current Medications      Current Outpatient Medications:     acetaminophen (TYLENOL) 325 MG  tablet, Take 650 mg by mouth Every 6 (Six) Hours As Needed for Mild Pain ., Disp: , Rfl:     Hydrocortisone, Perianal, (Anusol-HC) 2.5 % rectal cream, Apply rectally 2 times daily, Disp: 30 g, Rfl: 0     Current Facility-Administered Medications:     Lidocaine (Anorectal) (LMX 5) 5 % cream cream 1 Application, 1 Application, Topical, TID PRN, Perrenoud, Raven, DO              Allergies   No Known Allergies              Medical History        Past Medical History:   Diagnosis Date    Hx of migraines      Thyroid nodule      Work related injury      NECK INJURY WHILE IN THE                   Surgical History         Past Surgical History:   Procedure Laterality Date    BACK SURGERY        C5C6      SECTION WITH TUBAL   2011    COLONOSCOPY        EYE SURGERY Bilateral 2006     LASIK    THYROIDECTOMY Right 2021     Procedure: RIGHT THYROID LOBECTOMY;  Surgeon: Thompson Velásquez MD;  Location: [facility] [place];  Service: ENT;  Laterality: Right;    TONSILLECTOMY         ADENOIDECTOMY    TUBAL ABDOMINAL LIGATION   2011    VEIN LIGATION AND STRIPPING BILATERAL Bilateral                   Social History   Social History           Tobacco Use    Smoking status: Never    Smokeless tobacco: Never   Vaping Use    Vaping status: Never Used   Substance Use Topics    Alcohol use: Not Currently    Drug use: Never                       Immunization History   Administered Date(s) Administered    COVID-19 (IFTIKHAR) 2021    COVID-19 (MODERNA) Monovalent Original Booster 2022               Physical Exam  Vitals and nursing note reviewed.   Constitutional:       Appearance: Normal appearance.   Cardiovascular:      Rate and Rhythm: Normal rate and regular rhythm.   Pulmonary:      Effort: Pulmonary effort is normal.      Breath sounds: Normal breath sounds.   Neurological:      General: No focal deficit present.      Mental Status: She is alert and oriented to person, place, and time.  "  Psychiatric:         Mood and Affect: Mood normal.         Behavior: Behavior normal.            Physical Exam  Respiratory: Clear breath sounds bilaterally.  Cardiovascular: Regular rate and rhythm, no murmurs, rubs, or gallops.  Rectal: External hemorrhoid noted.     Debilities/Disabilities Identified: None     Emotional Behavior: Appropriate        /84   Pulse 72   Resp 16   Ht 154.9 cm (61\")   Wt 71.2 kg (157 lb)   BMI 29.66 kg/m²            Diagnoses and all orders for this visit:     1. External hemorrhoid (Primary)  -     Lidocaine (Anorectal) (LMX 5) 5 % cream cream 1 Application     2. History of colon polyps     3. Other dysphagia     Other orders  -     Hydrocortisone, Perianal, (Anusol-HC) 2.5 % rectal cream; Apply rectally 2 times daily  Dispense: 30 g; Refill: 0        Assessment & Plan  1. Hemorrhoids.  History of hemorrhoids exacerbated by diarrhea and childbirth, with current increased size and discomfort. Conservative therapy will be initiated, including sitz baths, Metamucil, Tucks pads, and increased water intake. Two creams, one for general use and another for pain management, will be prescribed and sent to her pharmacy. A colonoscopy and an upper endoscopy are scheduled for 06/17/2025 to further evaluate her condition.     2. Dysphagia.  Reports difficulty swallowing, with food sometimes getting stuck in her throat. An upper endoscopy is scheduled for 06/17/2025 to investigate this issue further.     Risks, benefits, and alternatives of treatment were discussed. Conservative therapy for hemorrhoids includes sitz baths, Metamucil, Tucks pads, and increased water intake, which may alleviate symptoms and potentially avoid the need for surgery. Hemorrhoid surgery, although effective, can be painful and hemorrhoids may recur. The necessity of a colonoscopy and upper endoscopy was emphasized to ensure comprehensive evaluation and appropriate treatment.  "

## 2025-07-15 NOTE — OP NOTE
EGD and Colonoscopy Procedure Note      Pre-operative Diagnosis: Dysphagia, history of colon polyps, hemorrhoids    Post-operative Diagnosis: Mild gastritis, normal colon    Procedure:  EGD with with biopsy for H. pylori using cold forceps and  Colonoscopy       Surgeon: Ara    Anesthetic: MAC per Coral Marie CRNA    Estimated Blood Loss: Minimal    Complications: None    Indications: See preoperative diagnosis    Findings/Treatments:   Mild gastritis-biopsied for H. pylori using cold forceps       Scope Withdrawal Time:  6 minutes      Recommendations: Chante will need a 5-year repeat colonoscopy due to her history of colon polyps      Procedure Details     After discussing the benefits and risks of an EGD and Colonoscopy, benefits and risks not limited to but including:  Bleeding, infection, perforation, aspiration; informed consent was signed.  The patient was taken into the endoscopy suite at [facility] and placed in the left lateral decubitus position.  MAC anesthesia was induced under appropriate monitoring.  Bite block was placed and the gastroscope was inserted thru such and advanced under direct vision to second portion of the duodenum.  A careful inspection was made as the gastroscope was withdrawn, including a retroflexed view of the proximal stomach; findings and interventions are described below.  If biopsies were taken, this was done with the cold biopsy forceps.    The bed was then rotated 180 degrees.  A rectal exam was performed.  Sphincter tone was normal.  The colonoscope was then inserted and carefully advanced to the cecum while visualizing the mucosa.  The cecum was identified by the ileocecal valve and the orifice of the appendix.  Once in the cecum the scope was slowly withdrawn while carefully evaluating the mucosa elevating air.  She had a very tortuous colon but there were no abnormalities noted.  The rectum was evaluated the scope was removed and  Chante was taken to the recovery area in stable postoperative condition having tolerated procedure well.    Raven Bullock DO

## 2025-07-15 NOTE — ANESTHESIA POSTPROCEDURE EVALUATION
Patient: Chante Thompson    Procedure Summary       Date: 07/15/25 Room / Location: [facility] ENDOSCOPY 1 /  [facility] OR    Anesthesia Start: 0821 Anesthesia Stop: 0918    Procedures:       ESOPHAGOGASTRODUODENOSCOPY WITH BIOPSY (Esophagus)      COLONOSCOPY Diagnosis:       External hemorrhoid      History of colon polyps      Other dysphagia      (External hemorrhoid [K64.4])      (History of colon polyps [Z86.0100])      (Other dysphagia [R13.19])    Surgeons: Raven Bullock DO Provider: Coral Marie CRNA    Anesthesia Type: MAC ASA Status: 2            Anesthesia Type: MAC    Vitals  Vitals Value Taken Time   /63 07/15/25 09:44   Temp 97.9 °F (36.6 °C) 07/15/25 09:25   Pulse 52 07/15/25 09:48   Resp 14 07/15/25 09:40   SpO2 95 % 07/15/25 09:40   Vitals shown include unfiled device data.        Post Anesthesia Care and Evaluation    Patient location during evaluation: PHASE II  Patient participation: complete - patient participated  Level of consciousness: awake  Pain management: adequate    Airway patency: patent  Anesthetic complications: No anesthetic complications  PONV Status: none  Cardiovascular status: acceptable  Respiratory status: acceptable  Hydration status: acceptable

## 2025-07-15 NOTE — ANESTHESIA PREPROCEDURE EVALUATION
Anesthesia Evaluation     Patient summary reviewed and Nursing notes reviewed   NPO Solid Status: > 8 hours             Airway   Mallampati: II  TM distance: >3 FB  Neck ROM: full  no difficulty expected  Dental - normal exam     Pulmonary - negative pulmonary ROS and normal exam    breath sounds clear to auscultation  Cardiovascular - normal exam    Rhythm: regular  Rate: normal    (+) pericardial effusion, hyperlipidemia      Neuro/Psych  (+) headaches  GI/Hepatic/Renal/Endo - negative ROS     Musculoskeletal     (+) neck pain (C5-C6 fusion (plates and screws)), radiculopathy  Abdominal  - normal exam   Substance History - negative use     OB/GYN negative ob/gyn ROS         Other        ROS/Med Hx Other: Last semiglutide 10d ago                Anesthesia Plan    ASA 2     MAC     intravenous induction     Anesthetic plan, risks, benefits, and alternatives have been provided, discussed and informed consent has been obtained with: patient.    Use of blood products discussed with  Consented to blood products.    Plan discussed with CRNA.

## 2025-07-16 LAB — UREASE TISS QL: NEGATIVE

## 2025-07-21 ENCOUNTER — OFFICE VISIT (OUTPATIENT)
Dept: SURGERY | Facility: CLINIC | Age: 50
End: 2025-07-21
Payer: COMMERCIAL

## 2025-07-21 DIAGNOSIS — K27.9 PUD (PEPTIC ULCER DISEASE): Primary | ICD-10-CM

## 2025-07-21 PROCEDURE — 99213 OFFICE O/P EST LOW 20 MIN: CPT | Performed by: SURGERY

## 2025-07-21 NOTE — LETTER
2025     ZAHEER Trevino  [address] [place] [id number]    Patient: Chante Thompson   YOB: 1975   Date of Visit: 2025     Dear ZAHEER Trevino:       Thank you for referring Chante Thompson to me for evaluation. Below are the relevant portions of my assessment and plan of care.    If you have questions, please do not hesitate to call me. I look forward to following Chante along with you.         Sincerely,        Raven Bullock DO        CC: No Recipients    Raven Bullock DO  25 0813  Sign when Signing Visit  Chante Thompson 49 y.o. female presents for  FU EGD/C-Scope.  Pt feels well and reports she currently does not have any hemorrhoids.     History of Present Illness  The patient is here for a follow-up visit after undergoing an EGD and colonoscopy.    She reports that her hemorrhoids have resolved. She has a history of polyps.    SOCIAL HISTORY  Exercise: The patient is advised to get steps in and exercise.  Diet: The patient is advised to eat right and drink 8 to 10 glasses of water a day.  Coffee/Tea/Caffeine-containing Drinks: The patient is advised to give up sodas.    Chante is doing well.  Her colonoscopy was normal.  Her EGD showed mild gastritis.  She is taking the Carafate and doing well.      Review of Systems        Past Medical History:   Diagnosis Date   • Hx of migraines    • Hyperlipidemia    • Thyroid nodule    • Work related injury     NECK INJURY WHILE IN THE            Past Surgical History:   Procedure Laterality Date   • BACK SURGERY      C5C6    •  SECTION WITH TUBAL  2011   • COLONOSCOPY     • COLONOSCOPY N/A 7/15/2025    Procedure: COLONOSCOPY;  Surgeon: Raven Bullock DO;  Location: [facility] OR;  Service: Gastroenterology;  Laterality: N/A;   • ENDOSCOPY N/A 7/15/2025    Procedure: ESOPHAGOGASTRODUODENOSCOPY WITH BIOPSY;  Surgeon: Raven Bullock DO;  Location: [facility] OR;   Service: Gastroenterology;  Laterality: N/A;  gastritis   • EYE SURGERY Bilateral 2006    LASIK   • THYROIDECTOMY Right 11/30/2021    Procedure: RIGHT THYROID LOBECTOMY;  Surgeon: Thompson Velásquez MD;  [facility] [facility] [place] [facility] [place];  Service: ENT;  Laterality: Right;   • TONSILLECTOMY      ADENOIDECTOMY   • TUBAL ABDOMINAL LIGATION  2011   • VEIN LIGATION AND STRIPPING BILATERAL Bilateral 2017           Physical Exam  Constitutional:       Appearance: Normal appearance.   Neurological:      General: No focal deficit present.      Mental Status: She is alert and oriented to person, place, and time.   Psychiatric:         Mood and Affect: Mood normal.         Behavior: Behavior normal.       Physical Exam  Gastrointestinal: Mild gastritis noted.  Rectal: No hemorrhoids present.      There were no vitals taken for this visit.        There are no diagnoses linked to this encounter.  Assessment & Plan  1. Gastritis.  Slight irritation in the stomach was noted during the EGD, consistent with gastritis. NSAIDs such as Advil, ibuprofen, Aleve, and naproxen should be avoided due to their potential to exacerbate gastritis and harm kidney function. Dietary modifications, including eliminating sodas, are recommended. Carafate has been prescribed to help with the gastritis. If symptoms improve after a few months, discontinuation of the medication is an option.    2. Hemorrhoids.  No hemorrhoids were detected during the colonoscopy. Daily intake of Metamucil, drinking 8 to 10 glasses of water, regular exercise, and a healthy diet are advised. If hemorrhoids recur despite these measures, surgical intervention will be considered. Conservative therapy is preferred as hemorrhoids can return even after surgery.    3. Increased risk of colon cancer.  Due to a history of polyps, there is an increased risk of colon cancer. A repeat colonoscopy is scheduled for 5 years from now. Daily Metamucil intake is recommended to help decrease the  risk.    Patient or patient representative verbalized consent for the use of Ambient Listening during the visit with  Raven Bullock DO for chart documentation. 7/21/2025  08:13 EDT

## 2025-07-21 NOTE — PROGRESS NOTES
Chante Thompson 49 y.o. female presents for  FU EGD/C-Scope.  Pt feels well and reports she currently does not have any hemorrhoids.     History of Present Illness  The patient is here for a follow-up visit after undergoing an EGD and colonoscopy.    She reports that her hemorrhoids have resolved. She has a history of polyps.    SOCIAL HISTORY  Exercise: The patient is advised to get steps in and exercise.  Diet: The patient is advised to eat right and drink 8 to 10 glasses of water a day.  Coffee/Tea/Caffeine-containing Drinks: The patient is advised to give up sodas.    Chante is doing well.  Her colonoscopy was normal.  Her EGD showed mild gastritis.  She is taking the Carafate and doing well.      Review of Systems        Past Medical History:   Diagnosis Date    Hx of migraines     Hyperlipidemia     Thyroid nodule     Work related injury     NECK INJURY WHILE IN THE            Past Surgical History:   Procedure Laterality Date    BACK SURGERY      C5C6      SECTION WITH TUBAL  2011    COLONOSCOPY      COLONOSCOPY N/A 7/15/2025    Procedure: COLONOSCOPY;  Surgeon: Raven Bullock DO;  Location: [facility] OR;  Service: Gastroenterology;  Laterality: N/A;    ENDOSCOPY N/A 7/15/2025    Procedure: ESOPHAGOGASTRODUODENOSCOPY WITH BIOPSY;  Surgeon: Raven Bullock DO;  Location: [facility] OR;  Service: Gastroenterology;  Laterality: N/A;  gastritis    EYE SURGERY Bilateral     LASIK    THYROIDECTOMY Right 2021    Procedure: RIGHT THYROID LOBECTOMY;  Surgeon: Thompson Velásquez MD;  Location: [facility] OR [facility] [place];  Service: ENT;  Laterality: Right;    TONSILLECTOMY      ADENOIDECTOMY    TUBAL ABDOMINAL LIGATION      VEIN LIGATION AND STRIPPING BILATERAL Bilateral            Physical Exam  Constitutional:       Appearance: Normal appearance.   Neurological:      General: No focal deficit present.      Mental Status: She is alert and oriented to person, place, and time.    Psychiatric:         Mood and Affect: Mood normal.         Behavior: Behavior normal.       Physical Exam  Gastrointestinal: Mild gastritis noted.  Rectal: No hemorrhoids present.      There were no vitals taken for this visit.        There are no diagnoses linked to this encounter.  Assessment & Plan  1. Gastritis.  Slight irritation in the stomach was noted during the EGD, consistent with gastritis. NSAIDs such as Advil, ibuprofen, Aleve, and naproxen should be avoided due to their potential to exacerbate gastritis and harm kidney function. Dietary modifications, including eliminating sodas, are recommended. Carafate has been prescribed to help with the gastritis. If symptoms improve after a few months, discontinuation of the medication is an option.    2. Hemorrhoids.  No hemorrhoids were detected during the colonoscopy. Daily intake of Metamucil, drinking 8 to 10 glasses of water, regular exercise, and a healthy diet are advised. If hemorrhoids recur despite these measures, surgical intervention will be considered. Conservative therapy is preferred as hemorrhoids can return even after surgery.    3. Increased risk of colon cancer.  Due to a history of polyps, there is an increased risk of colon cancer. A repeat colonoscopy is scheduled for 5 years from now. Daily Metamucil intake is recommended to help decrease the risk.    Patient or patient representative verbalized consent for the use of Ambient Listening during the visit with  Raven Bullock DO for chart documentation. 7/21/2025  08:13 EDT

## (undated) DEVICE — SUT VIC 3/0 SH CR8 18IN J864D

## (undated) DEVICE — SOL IRR H2O BO 1000ML STRL

## (undated) DEVICE — TRAP FLD MINIVAC MEGADYNE 100ML

## (undated) DEVICE — DISPOSABLE BIPOLAR CABLE 12FT. (3.6M): Brand: KIRWAN

## (undated) DEVICE — FRCP BX RADJAW4 NDL 2.8 240CM LG OG BX40

## (undated) DEVICE — 3M™ STERI-DRAPE™ INSTRUMENT POUCH 1018: Brand: STERI-DRAPE™

## (undated) DEVICE — SUT MNCRYL 5/0 PS2 18IN Y495G

## (undated) DEVICE — GLV SURG BIOGEL LTX PF 7 1/2

## (undated) DEVICE — PREMIUM WET SKIN PREP TRAY: Brand: MEDLINE INDUSTRIES, INC.

## (undated) DEVICE — KT ORCA ORCAPOD DISP STRL

## (undated) DEVICE — PK ENT 40

## (undated) DEVICE — LINER SURG CANSTR SXN S/RIGD 1500CC

## (undated) DEVICE — Device

## (undated) DEVICE — HARMONIC FOCUS SHEARS 9CM LENGTH + ADAPTIVE TISSUE TECHNOLOGY FOR USE WITH BLUE HAND PIECE ONLY: Brand: HARMONIC FOCUS

## (undated) DEVICE — IRRIGATOR BULB ASEPTO 60CC STRL

## (undated) DEVICE — THE BITE BLOCK MAXI, LATEX FREE STRAP IS USED TO PROTECT THE ENDOSCOPE INSERTION TUBE FROM BEING BITTEN BY THE PATIENT.

## (undated) DEVICE — ADHS SKIN SURG TISS VISC PREMIERPRO EXOFIN HI/VISC FAST/DRY

## (undated) DEVICE — PATIENT RETURN ELECTRODE, SINGLE-USE, CONTACT QUALITY MONITORING, ADULT, WITH 9FT CORD, FOR PATIENTS WEIGING OVER 33LBS. (15KG): Brand: MEGADYNE

## (undated) DEVICE — 7 FRENCH DRAIN SYSTEM, STERILE: Brand: TLS

## (undated) DEVICE — LAB CORP AGAR SLANT UREA PK/10

## (undated) DEVICE — BW-412T DISP COMBO CLEANING BRUSH: Brand: SINGLE USE COMBINATION CLEANING BRUSH

## (undated) DEVICE — SUT SILK 2/0 SH 30IN K833H

## (undated) DEVICE — ADAPT CLN BIOGUARD AIR/H2O DISP

## (undated) DEVICE — PENCL E/S ULTRAVAC TELESCP NOSE HOLSTR 10FT

## (undated) DEVICE — NDL HYPO PRECISIONGLIDE REG 25G 1 1/2

## (undated) DEVICE — ELECTRD NDL EZ CLN STD 2.75IN

## (undated) DEVICE — SYR LL W/SCALE/MARK 3ML STRL